# Patient Record
Sex: MALE | URBAN - METROPOLITAN AREA
[De-identification: names, ages, dates, MRNs, and addresses within clinical notes are randomized per-mention and may not be internally consistent; named-entity substitution may affect disease eponyms.]

---

## 2018-10-03 NOTE — EMERGENCY DEPARTMENT REPORT
ED Extremity Problem HPI





- General


Chief complaint: Extremity Injury, Lower


Stated complaint: (L) LEG PAIN


Time Seen by Provider: 10/03/18 20:24


Source: patient


Mode of arrival: Ambulatory


Limitations: No Limitations





- History of Present Illness


Initial comments: 





Patient reports that he has left leg pain and states that started in his 

buttocks and moved his left hamstring 2 days.  He said this is going on for 2 

years and it comes and goes.  Denies any history of back injury or back pain.  

He said he feels like spasm.  Pain is 10 out of 10 when it is there and 

intermittent.  Denies any numbness or tingling.  Denies any loss of bowel or 

bladder function.  No medication taken anything over seen a doctor for the 

problem.


MD Complaint: extremity pain


Onset/Timin


-: year(s)


Location: left, lower extremity


-: No myalgia, Yes arthralgia, No fever, No associated chest pain


Radiation: none


Severity scale (0 -10): 10


Quality: aching


Consistency: intermittent


Improves with: nothing


Worsens with: nothing


Associated Symptoms: arthralgias.  denies: chest pain, shortness of breath, 

fever, myalgias, rash





- Related Data


 Previous Rx's











 Medication  Instructions  Recorded  Last Taken  Type


 


Fluconazole [Diflucan] 150 mg PO QDAY #2 tablet 10/28/13 Unknown Rx


 


Hydrocodone Bit/Acetaminophen 1 each PO Q4-6H #10 tablet 10/28/13 Unknown Rx





[Lortab 5-500 Tablet]    


 


Ibuprofen [Motrin] 800 mg PO TID PRN #25 tablet 10/28/13 Unknown Rx


 


Sulfamethoxazole/Trimethoprim 1 each PO BID #28 tablet 10/28/13 Unknown Rx





[Bactrim DS]    


 


Ibuprofen [Motrin] 800 mg PO Q8HR PRN #15 tablet 10/03/18 Unknown Rx











 Allergies











Allergy/AdvReac Type Severity Reaction Status Date / Time


 


No Known Allergies Allergy   Verified 10/03/18 22:03














ED Review of Systems


ROS: 


Stated complaint: (L) LEG PAIN


Other details as noted in HPI





Constitutional: denies: chills, fever


Eyes: denies: eye pain, eye discharge


Respiratory: denies: cough, shortness of breath, SOB with exertion, SOB at rest

, stridor, wheezing


Cardiovascular: denies: chest pain, palpitations, edema, syncope


Gastrointestinal: denies: abdominal pain, nausea, vomiting, diarrhea


Genitourinary: denies: urgency, dysuria


Musculoskeletal: arthralgia.  denies: back pain, joint swelling, myalgia


Skin: denies: rash, lesions


Neurological: denies: headache, weakness, numbness, paresthesias, confusion, 

abnormal gait, vertigo





ED Past Medical Hx





- Past Medical History


Previous Medical History?: Yes


Hx Asthma: Yes


Additional medical history: crohns





- Surgical History


Past Surgical History?: Yes


Hx Appendectomy: Yes





- Family History


Family history: hypertension





- Social History


Smoking Status: Current Every Day Smoker


Substance Use Type: None





- Medications


Home Medications: 


 Home Medications











 Medication  Instructions  Recorded  Confirmed  Last Taken  Type


 


Fluconazole [Diflucan] 150 mg PO QDAY #2 tablet 10/28/13  Unknown Rx


 


Hydrocodone Bit/Acetaminophen 1 each PO Q4-6H #10 tablet 10/28/13  Unknown Rx





[Lortab 5-500 Tablet]     


 


Ibuprofen [Motrin] 800 mg PO TID PRN #25 tablet 10/28/13  Unknown Rx


 


Sulfamethoxazole/Trimethoprim 1 each PO BID #28 tablet 10/28/13  Unknown Rx





[Bactrim DS]     


 


Ibuprofen [Motrin] 800 mg PO Q8HR PRN #15 tablet 10/03/18  Unknown Rx














ED Physical Exam





- General


Limitations: No Limitations


General appearance: alert, in no apparent distress





- Head


Head exam: Present: atraumatic, normocephalic, normal inspection





- Eye


Eye exam: Present: normal appearance, PERRL, EOMI


Pupils: Present: normal accommodation





- ENT


ENT exam: Present: normal exam, normal orophraynx, mucous membranes moist, TM's 

normal bilaterally, normal external ear exam





- Neck


Neck exam: Present: normal inspection, full ROM, other (no C-spine tenderness).

  Absent: tenderness, lymphadenopathy





- Respiratory


Respiratory exam: Present: normal lung sounds bilaterally.  Absent: respiratory 

distress, chest wall tenderness





- Cardiovascular


Cardiovascular Exam: Present: regular rate, normal rhythm, normal heart sounds.

  Absent: systolic murmur, diastolic murmur





- GI/Abdominal


GI/Abdominal exam: Present: soft, normal bowel sounds.  Absent: distended, 

tenderness





- Extremities Exam


Extremities exam: Present: normal inspection, full ROM, normal capillary refill

, other (No cce. + 2 pulses in all extremities, no neurovascular compromise).  

Absent: tenderness, pedal edema, joint swelling, calf tenderness





- Back Exam


Back exam: Present: normal inspection, full ROM, other (ambulates without any 

difficulties).  Absent: tenderness, CVA tenderness (R), CVA tenderness (L), 

muscle spasm, paraspinal tenderness, vertebral tenderness, rash noted





- Expanded Back Exam


  ** Expanded


Back exam: Absent: saddle anesthesia


Back exam: Negative Straight Leg Raising: Left, Right





- Neurological Exam


Neurological exam: Present: alert, oriented X3, normal gait, reflexes normal.  

Absent: motor sensory deficit





- Psychiatric


Psychiatric exam: Present: normal affect, normal mood





- Skin


Skin exam: Present: warm, dry, intact, normal color.  Absent: rash





ED Course


 Vital Signs











  10/03/18





  18:03


 


Temperature 99.0 F


 


Pulse Rate 70


 


Respiratory 18





Rate 


 


Blood Pressure 162/85


 


O2 Sat by Pulse 99





Oximetry 














- Reevaluation(s)


Reevaluation #1: 





10/03/18 22:03


Patient given Motrin 800 mg by mouth for pain.





ED Medical Decision Making





- Medical Decision Making





This is a 33-year-old male here report that he does have an left lower 

extremity pain 2 years and he came back 2 days ago.  He has not been seen by 

.  Denies any injury or any problem with his back.








Assessment/plan


Arthralgia, left lower extremity-physical findings is normal.  Patient given 

Motrin 800 mg by mouth and referred to orthopedic doctor and also some outside 

Medical Center to follow-up in 2 days.





I discussed the patient diagnosis and treatment plan.  I discussed the exam to 

follow-up with orthopedic doctor.  Patient voiced understanding.  I referred 

him to some outside Medical Center as he does not have a primary care doctor 

and also to Dr. Hayes or [facility] to manage his chronic leg pain.  Patient 

discharged home with prescription for Motrin, vital signs are stable he is 

afebrile


Critical care attestation.: 


If time is entered above; I have spent that time in minutes in the direct care 

of this critically ill patient, excluding procedure time.








ED Disposition


Clinical Impression: 


 Arthralgia of left lower leg





Disposition: - TO HOME OR SELFCARE


Is pt being admited?: No


Does the pt Need Aspirin: No


Condition: Stable


Instructions:  Arthralgia (ED)


Additional Instructions: 


While up with Dr. Hayes and [facility] in 2 days


Motrin as prescribed


If your symptoms worsen, return to the emergency room





Prescriptions: 


Ibuprofen [Motrin] 800 mg PO Q8HR PRN #15 tablet


 PRN Reason: pain


Referrals: 


DAMIEN HAYES MD [Staff Physician] - 10/05/18


[facility] [Outside] - 10/05/18


Forms:  Work/School Release Form(ED)

## 2020-08-31 ENCOUNTER — OFFICE VISIT (OUTPATIENT)
Dept: URBAN - METROPOLITAN AREA CLINIC 118 | Facility: CLINIC | Age: 35
End: 2020-08-31

## 2020-11-29 ENCOUNTER — HOSPITAL ENCOUNTER (EMERGENCY)
Dept: HOSPITAL 5 - ED | Age: 35
LOS: 1 days | Discharge: HOME | End: 2020-11-30
Payer: SELF-PAY

## 2020-11-29 DIAGNOSIS — K50.90: ICD-10-CM

## 2020-11-29 DIAGNOSIS — Z90.49: ICD-10-CM

## 2020-11-29 DIAGNOSIS — I10: ICD-10-CM

## 2020-11-29 DIAGNOSIS — K64.4: Primary | ICD-10-CM

## 2020-11-29 DIAGNOSIS — F17.200: ICD-10-CM

## 2020-11-29 DIAGNOSIS — J45.909: ICD-10-CM

## 2020-11-29 DIAGNOSIS — Z79.899: ICD-10-CM

## 2020-11-29 PROCEDURE — 99282 EMERGENCY DEPT VISIT SF MDM: CPT

## 2020-11-30 VITALS — DIASTOLIC BLOOD PRESSURE: 89 MMHG | SYSTOLIC BLOOD PRESSURE: 150 MMHG

## 2020-11-30 NOTE — EMERGENCY DEPARTMENT REPORT
ED General Adult HPI





- General


Chief complaint: Rectal Pain


Stated complaint: HEMORRHOIDS


Time Seen by Provider: 11/30/20 00:52


Source: patient


Mode of arrival: Ambulatory


Limitations: No Limitations





- History of Present Illness


Initial comments: 





35-year-old -American male patient with history of Crohn's disease 

presents with complaints of hemorrhoid pain x3 days.  He states there is itching

and pain especially with bowel movement.  He denies any fever/chills/sweats, 

hematochezia/melena, diarrhea, or abdominal pain.  He admits to recent const

ipation and straining, however states he is now having normal bowel movements.  

Patient states naproxen is not helping with his pain.


Severity scale (0 -10): 3





- Related Data


                                  Previous Rx's











 Medication  Instructions  Recorded  Last Taken  Type


 


Fluconazole [Diflucan] 150 mg PO QDAY #2 tablet 10/28/13 Unknown Rx


 


Hydrocodone Bit/Acetaminophen 1 each PO Q4-6H #10 tablet 10/28/13 Unknown Rx





[Lortab 5-500 Tablet]    


 


Ibuprofen [Motrin] 800 mg PO TID PRN #25 tablet 10/28/13 Unknown Rx


 


Sulfamethoxazole/Trimethoprim 1 each PO BID #28 tablet 10/28/13 Unknown Rx





[Bactrim DS]    


 


Ibuprofen [Motrin] 800 mg PO Q8HR PRN #15 tablet 10/03/18 Unknown Rx


 


Acetaminophen/Codeine [Tylenol 1 tab PO Q8H PRN #6 tab 11/30/20 Unknown Rx





/Codeine # 3 tab]    


 


Docusate Sodium [Colace] 100 mg PO BID PRN #20 capsule 11/30/20 Unknown Rx


 


Hydrocortisone [Anusol-Hc 2.5% TOP 30 gm RC TID PRN 7 Days #1 11/30/20 Unknown 

Rx





CREAM] cream..g.   


 


hydroCHLOROthiazide [Hctz] 12.5 mg PO QDAY 30 Days #30 capsule 11/30/20 Unknown 

Rx











                                    Allergies











Allergy/AdvReac Type Severity Reaction Status Date / Time


 


No Known Allergies Allergy   Verified 10/03/18 22:03














ED Review of Systems


ROS: 


Stated complaint: HEMORRHOIDS


Other details as noted in HPI





Constitutional: denies: chills, fever, malaise


ENT: denies: throat pain


Respiratory: denies: cough, shortness of breath


Cardiovascular: denies: chest pain, edema


Gastrointestinal: denies: abdominal pain, diarrhea, constipation, hematochezia


Genitourinary: denies: urgency, dysuria


Musculoskeletal: denies: joint swelling


Skin: denies: change in color


Neurological: denies: headache, weakness, numbness, paresthesias, vertigo





ED Past Medical Hx





- Past Medical History


Hx Asthma: Yes


Additional medical history: crohns





- Surgical History


Hx Appendectomy: Yes





- Social History


Smoking Status: Current Every Day Smoker


Substance Use Type: None





- Medications


Home Medications: 


                                Home Medications











 Medication  Instructions  Recorded  Confirmed  Last Taken  Type


 


Fluconazole [Diflucan] 150 mg PO QDAY #2 tablet 10/28/13  Unknown Rx


 


Hydrocodone Bit/Acetaminophen 1 each PO Q4-6H #10 tablet 10/28/13  Unknown Rx





[Lortab 5-500 Tablet]     


 


Ibuprofen [Motrin] 800 mg PO TID PRN #25 tablet 10/28/13  Unknown Rx


 


Sulfamethoxazole/Trimethoprim 1 each PO BID #28 tablet 10/28/13  Unknown Rx





[Bactrim DS]     


 


Ibuprofen [Motrin] 800 mg PO Q8HR PRN #15 tablet 10/03/18  Unknown Rx


 


Acetaminophen/Codeine [Tylenol 1 tab PO Q8H PRN #6 tab 11/30/20  Unknown Rx





/Codeine # 3 tab]     


 


Docusate Sodium [Colace] 100 mg PO BID PRN #20 capsule 11/30/20  Unknown Rx


 


Hydrocortisone [Anusol-Hc 2.5% TOP 30 gm RC TID PRN 7 Days #1 11/30/20  Unknown 

Rx





CREAM] cream..g.    


 


hydroCHLOROthiazide [Hctz] 12.5 mg PO QDAY 30 Days #30 capsule 11/30/20  Unknown

 Rx














ED Physical Exam





- General


Limitations: No Limitations


General appearance: alert, in no apparent distress





- Head


Head exam: Present: atraumatic, normocephalic





- Eye


Eye exam: Absent: scleral icterus





- ENT


ENT exam: Present: mucous membranes moist





- Neck


Neck exam: Present: normal inspection





- Respiratory


Respiratory exam: Present: normal lung sounds bilaterally.  Absent: respiratory 

distress





- Cardiovascular


Cardiovascular Exam: Present: regular rate, normal rhythm





- GI/Abdominal


GI/Abdominal exam: Present: soft.  Absent: tenderness





- Rectal


Rectal exam: Present: hemorrhoids (External, nonthrombosed)





- Extremities Exam


Extremities exam: Present: normal inspection





- Neurological Exam


Neurological exam: Present: alert, oriented X3, normal gait





- Psychiatric


Psychiatric exam: Present: normal affect, normal mood





- Skin


Skin exam: Present: warm, dry, intact, normal color.  Absent: rash, cyanosis, 

diaphoretic, erythema, ecchymosis





ED Course


                                   Vital Signs











  11/30/20





  00:17


 


Temperature 97.8 F


 


Pulse Rate 75


 


Respiratory 18





Rate 


 


O2 Sat by Pulse 98





Oximetry 














ED Medical Decision Making





- Medical Decision Making








35-year-old -American male patient with history of Crohn's disease 

presents with complaints of hemorrhoid pain x3 days.  He states there is itching

 and pain especially with bowel movement.  He denies any fever/chills/sweats, 

hematochezia/melena, diarrhea, or abdominal pain.  He admits to recent 

constipation and straining, however states he is now having normal bowel 

movements.  Patient states naproxen is not helping with his pain.


External nonthrombosed hemorrhoids noted on exam.  Will treat with Anusol and 

pain medication.  Recommend follow-up with GI.  Patient blood pressure noted to 

be elevated and was also elevated at his last visit on 10/3/2020.  He denies 

prior history of hypertension and states he has not follow-up with a primary 

care doctor concerning this.  Will start patient on HCTZ 12.5 mg and discussed 

in detail importance of follow-up with a PCP within 2 to 3 days for further 

evaluation of his blood pressure.  Patient verbalized understanding.  He is 

well-appearing and stable for discharge home peer


Critical care attestation.: 


If time is entered above; I have spent that time in minutes in the direct care 

of this critically ill patient, excluding procedure time.








ED Disposition


Clinical Impression: 


 External hemorrhoids





High blood pressure


Qualifiers:


 Hypertension type: essential hypertension Qualified Code(s): I10 - Essential 

(primary) hypertension





Disposition: DC-01 TO HOME OR SELFCARE


Is pt being admited?: No


Condition: Stable


Instructions:  Hemorrhoids, Managing Your Hypertension, Hypertension, Adult, 

Hypertension (ED)


Prescriptions: 


Hydrocortisone [Anusol-Hc 2.5% TOP CREAM] 30 gm RC TID PRN 7 Days #1 cream..g.


 PRN Reason: pain/itching from hemorrhoids


Docusate Sodium [Colace] 100 mg PO BID PRN #20 capsule


 PRN Reason: Constipation


hydroCHLOROthiazide [Hctz] 12.5 mg PO QDAY 30 Days #30 capsule


Acetaminophen/Codeine [Tylenol /Codeine # 3 tab] 1 tab PO Q8H PRN #6 tab


 PRN Reason: Pain , Severe (7-10)


Referrals: 


[facility] [Provider Group] - 2-3 Days

## 2022-03-07 ENCOUNTER — HOSPITAL ENCOUNTER (EMERGENCY)
Dept: HOSPITAL 5 - ED | Age: 37
LOS: 1 days | Discharge: HOME | End: 2022-03-08
Payer: SELF-PAY

## 2022-03-07 DIAGNOSIS — S43.005A: Primary | ICD-10-CM

## 2022-03-07 DIAGNOSIS — Z90.89: ICD-10-CM
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
## 2022-03-07 DIAGNOSIS — F17.200: ICD-10-CM
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
## 2022-03-07 PROCEDURE — 96361 HYDRATE IV INFUSION ADD-ON: CPT

## 2022-03-07 PROCEDURE — 96375 TX/PRO/DX INJ NEW DRUG ADDON: CPT

## 2022-03-07 PROCEDURE — 99283 EMERGENCY DEPT VISIT LOW MDM: CPT

## 2022-03-07 PROCEDURE — 23650 CLTX SHO DSLC W/MNPJ WO ANES: CPT

## 2022-03-07 PROCEDURE — 73030 X-RAY EXAM OF SHOULDER: CPT

## 2022-03-07 PROCEDURE — 96372 THER/PROPH/DIAG INJ SC/IM: CPT

## 2022-03-07 PROCEDURE — 96374 THER/PROPH/DIAG INJ IV PUSH: CPT

## 2022-03-07 NOTE — EMERGENCY DEPARTMENT REPORT
ED Upper Extremity Inj HPI





- General


Chief Complaint: Extremity Injury, Upper


Stated Complaint: SHOULDER PAIN


Time Seen by Provider: 03/07/22 20:54


Source: patient


Mode of arrival: Ambulatory


Limitations: No Limitations





- History of Present Illness


MD Complaint: Injury to:: left


-: Sudden, days(s)


Other Extremity Injury: Shoulder: Left


Severity scale (0 -10): 5


Improves With: none





- Related Data


                                  Previous Rx's











 Medication  Instructions  Recorded  Last Taken  Type


 


Fluconazole [Diflucan] 150 mg PO QDAY #2 tablet 10/28/13 Unknown Rx


 


Hydrocodone Bit/Acetaminophen 1 each PO Q4-6H #10 tablet 10/28/13 Unknown Rx





[Lortab 5-500 Tablet]    


 


Ibuprofen [Motrin] 800 mg PO TID PRN #25 tablet 10/28/13 Unknown Rx


 


Sulfamethoxazole/Trimethoprim 1 each PO BID #28 tablet 10/28/13 Unknown Rx





[Bactrim DS]    


 


Ibuprofen [Motrin] 800 mg PO Q8HR PRN #15 tablet 10/03/18 Unknown Rx


 


Acetaminophen/Codeine [Tylenol 1 tab PO Q8H PRN #6 tab 11/30/20 Unknown Rx





/Codeine # 3 tab]    


 


Docusate Sodium [Colace] 100 mg PO BID PRN #20 capsule 11/30/20 Unknown Rx


 


Hydrocortisone [Anusol-Hc 2.5% TOP 30 gm RC TID PRN 7 Days #1 11/30/20 Unknown 

Rx





CREAM] cream..g.   


 


hydroCHLOROthiazide [Hctz] 12.5 mg PO QDAY 30 Days #30 capsule 11/30/20 Unknown 

Rx











                                    Allergies











Allergy/AdvReac Type Severity Reaction Status Date / Time


 


No Known Allergies Allergy   Verified 10/03/18 22:03














ED Review of Systems


ROS: 


Stated complaint: SHOULDER PAIN


Other details as noted in HPI





Constitutional: denies: chills, fever


Eyes: denies: eye pain, eye discharge, vision change


ENT: denies: ear pain, throat pain


Respiratory: denies: cough, shortness of breath, wheezing


Cardiovascular: denies: chest pain, palpitations


Endocrine: no symptoms reported


Gastrointestinal: denies: abdominal pain, nausea, diarrhea


Genitourinary: denies: urgency, dysuria


Musculoskeletal: denies: back pain, joint swelling, arthralgia


Skin: denies: rash, lesions


Neurological: denies: headache, weakness, paresthesias


Psychiatric: denies: anxiety, depression


Hematological/Lymphatic: denies: easy bleeding, easy bruising





ED Past Medical Hx





- Past Medical History


Previous Medical History?: Yes


Hx Hypertension: No


Hx CVA: No


Hx Asthma: Yes


Additional medical history: crohns





- Surgical History


Past Surgical History?: Yes


Hx Appendectomy: Yes





- Social History


Smoking Status: Current Every Day Smoker


Substance Use Type: None





- Medications


Home Medications: 


                                Home Medications











 Medication  Instructions  Recorded  Confirmed  Last Taken  Type


 


Fluconazole [Diflucan] 150 mg PO QDAY #2 tablet 10/28/13  Unknown Rx


 


Hydrocodone Bit/Acetaminophen 1 each PO Q4-6H #10 tablet 10/28/13  Unknown Rx





[Lortab 5-500 Tablet]     


 


Ibuprofen [Motrin] 800 mg PO TID PRN #25 tablet 10/28/13  Unknown Rx


 


Sulfamethoxazole/Trimethoprim 1 each PO BID #28 tablet 10/28/13  Unknown Rx





[Bactrim DS]     


 


Ibuprofen [Motrin] 800 mg PO Q8HR PRN #15 tablet 10/03/18  Unknown Rx


 


Acetaminophen/Codeine [Tylenol 1 tab PO Q8H PRN #6 tab 11/30/20  Unknown Rx





/Codeine # 3 tab]     


 


Docusate Sodium [Colace] 100 mg PO BID PRN #20 capsule 11/30/20  Unknown Rx


 


Hydrocortisone [Anusol-Hc 2.5% TOP 30 gm RC TID PRN 7 Days #1 11/30/20  Unknown 

Rx





CREAM] cream..g.    


 


hydroCHLOROthiazide [Hctz] 12.5 mg PO QDAY 30 Days #30 capsule 11/30/20  Unknown

 Rx














ED Physical Exam





- General


Limitations: No Limitations


General appearance: alert, in no apparent distress





- Head


Head exam: Present: atraumatic, normocephalic





- Eye


Eye exam: Present: normal appearance





- ENT


ENT exam: Present: mucous membranes moist





- Neck


Neck exam: Present: normal inspection





- Respiratory


Respiratory exam: Present: normal lung sounds bilaterally.  Absent: respiratory 

distress





- Cardiovascular


Cardiovascular Exam: Present: regular rate, normal rhythm.  Absent: systolic 

murmur, diastolic murmur, rubs, gallop





- GI/Abdominal


GI/Abdominal exam: Present: soft, normal bowel sounds





- Rectal


Rectal exam: Present: deferred





- Extremities Exam


Extremities exam: Present: normal inspection





- Expanded Upper Extremity Exam


  ** Left


Shoulder Exam: Present: tenderness, deformity, dislocation





- Back Exam


Back exam: Present: normal inspection





- Neurological Exam


Neurological exam: Present: alert, oriented X3





- Psychiatric


Psychiatric exam: Present: normal affect, normal mood





- Skin


Skin exam: Present: warm, dry, intact, normal color.  Absent: rash





ED Course





                                   Vital Signs











  03/07/22





  20:17


 


Temperature 98.2 F


 


Pulse Rate 61


 


Respiratory 20





Rate 


 


Blood Pressure 101/61





[Right] 


 


O2 Sat by Pulse 100





Oximetry 














- Orthopedic Fracture Reduction


  ** Fracture #1


Consent Obtained: written consent


Time Out Performed: Yes


Side: left


Fracture Reduction Location: humerus


Analgesia: moderate sedation


Post Reduction X-rays Demonstrate: anatomical reduction


Post-Reduction Neuro Exam: intact


Post-Reduction Vascular Exam: intact


Splint Applied: Yes


Patient Tolerated Procedure: well


Critical care attestation.: 


If time is entered above; I have spent that time in minutes in the direct care 

of this critically ill patient, excluding procedure time.








ED Disposition


Clinical Impression: 


 Dislocation of left shoulder joint





Disposition: 01 HOME / SELF CARE / HOMELESS


Is pt being admited?: No


Does the pt Need Aspirin: No


Condition: Stable


Instructions:  Shoulder Dislocation, How to Use a Shoulder Immobilizer


Referrals: 


PIPPA THURMAN MD [Primary Care Provider] - 3-5 Days


FAB GOMEZ MD [Staff Physician] - 3-5 Days

## 2022-03-08 VITALS — DIASTOLIC BLOOD PRESSURE: 95 MMHG | SYSTOLIC BLOOD PRESSURE: 159 MMHG
